# Patient Record
Sex: FEMALE | URBAN - METROPOLITAN AREA
[De-identification: names, ages, dates, MRNs, and addresses within clinical notes are randomized per-mention and may not be internally consistent; named-entity substitution may affect disease eponyms.]

---

## 2023-10-16 ENCOUNTER — ORDER TRANSCRIPTION (OUTPATIENT)
Dept: PHYSICAL THERAPY | Facility: HOSPITAL | Age: 28
End: 2023-10-16

## 2023-10-16 DIAGNOSIS — M54.2 NECK PAIN: Primary | ICD-10-CM

## 2023-10-18 ENCOUNTER — LAB ENCOUNTER (OUTPATIENT)
Dept: LAB | Age: 28
End: 2023-10-18
Attending: INTERNAL MEDICINE
Payer: COMMERCIAL

## 2023-10-18 DIAGNOSIS — Z00.00 ROUTINE CHECK-UP: Primary | ICD-10-CM

## 2023-10-18 LAB
ALBUMIN SERPL-MCNC: 3.9 G/DL (ref 3.4–5)
ALBUMIN/GLOB SERPL: 0.9 {RATIO} (ref 1–2)
ALP LIVER SERPL-CCNC: 58 U/L
ALT SERPL-CCNC: 22 U/L
ANION GAP SERPL CALC-SCNC: 5 MMOL/L (ref 0–18)
AST SERPL-CCNC: 14 U/L (ref 15–37)
BASOPHILS # BLD AUTO: 0.06 X10(3) UL (ref 0–0.2)
BASOPHILS NFR BLD AUTO: 0.9 %
BILIRUB SERPL-MCNC: 0.4 MG/DL (ref 0.1–2)
BILIRUB UR QL STRIP.AUTO: NEGATIVE
BUN BLD-MCNC: 12 MG/DL (ref 7–18)
CALCIUM BLD-MCNC: 9.1 MG/DL (ref 8.5–10.1)
CHLORIDE SERPL-SCNC: 106 MMOL/L (ref 98–112)
CHOLEST SERPL-MCNC: 208 MG/DL (ref ?–200)
CLARITY UR REFRACT.AUTO: CLEAR
CO2 SERPL-SCNC: 28 MMOL/L (ref 21–32)
CREAT BLD-MCNC: 0.94 MG/DL
EGFRCR SERPLBLD CKD-EPI 2021: 86 ML/MIN/1.73M2 (ref 60–?)
EOSINOPHIL # BLD AUTO: 0.21 X10(3) UL (ref 0–0.7)
EOSINOPHIL NFR BLD AUTO: 3.3 %
ERYTHROCYTE [DISTWIDTH] IN BLOOD BY AUTOMATED COUNT: 12 %
FASTING PATIENT LIPID ANSWER: YES
FASTING STATUS PATIENT QL REPORTED: YES
GLOBULIN PLAS-MCNC: 4.2 G/DL (ref 2.8–4.4)
GLUCOSE BLD-MCNC: 95 MG/DL (ref 70–99)
GLUCOSE UR STRIP.AUTO-MCNC: NORMAL MG/DL
HCT VFR BLD AUTO: 41.2 %
HDLC SERPL-MCNC: 58 MG/DL (ref 40–59)
HGB BLD-MCNC: 13.4 G/DL
IMM GRANULOCYTES # BLD AUTO: 0.02 X10(3) UL (ref 0–1)
IMM GRANULOCYTES NFR BLD: 0.3 %
KETONES UR STRIP.AUTO-MCNC: NEGATIVE MG/DL
LDLC SERPL CALC-MCNC: 108 MG/DL (ref ?–100)
LEUKOCYTE ESTERASE UR QL STRIP.AUTO: NEGATIVE
LYMPHOCYTES # BLD AUTO: 2.82 X10(3) UL (ref 1–4)
LYMPHOCYTES NFR BLD AUTO: 43.7 %
MCH RBC QN AUTO: 28.3 PG (ref 26–34)
MCHC RBC AUTO-ENTMCNC: 32.5 G/DL (ref 31–37)
MCV RBC AUTO: 87.1 FL
MONOCYTES # BLD AUTO: 0.47 X10(3) UL (ref 0.1–1)
MONOCYTES NFR BLD AUTO: 7.3 %
NEUTROPHILS # BLD AUTO: 2.88 X10 (3) UL (ref 1.5–7.7)
NEUTROPHILS # BLD AUTO: 2.88 X10(3) UL (ref 1.5–7.7)
NEUTROPHILS NFR BLD AUTO: 44.5 %
NITRITE UR QL STRIP.AUTO: NEGATIVE
NONHDLC SERPL-MCNC: 150 MG/DL (ref ?–130)
OSMOLALITY SERPL CALC.SUM OF ELEC: 288 MOSM/KG (ref 275–295)
PH UR STRIP.AUTO: 7.5 [PH] (ref 5–8)
PLATELET # BLD AUTO: 300 10(3)UL (ref 150–450)
POTASSIUM SERPL-SCNC: 4.3 MMOL/L (ref 3.5–5.1)
PROT SERPL-MCNC: 8.1 G/DL (ref 6.4–8.2)
PROT UR STRIP.AUTO-MCNC: NEGATIVE MG/DL
RBC # BLD AUTO: 4.73 X10(6)UL
RBC UR QL AUTO: NEGATIVE
SODIUM SERPL-SCNC: 139 MMOL/L (ref 136–145)
SP GR UR STRIP.AUTO: 1.01 (ref 1–1.03)
TRIGL SERPL-MCNC: 245 MG/DL (ref 30–149)
TSI SER-ACNC: 1.03 MIU/ML (ref 0.36–3.74)
UROBILINOGEN UR STRIP.AUTO-MCNC: NORMAL MG/DL
VLDLC SERPL CALC-MCNC: 42 MG/DL (ref 0–30)
WBC # BLD AUTO: 6.5 X10(3) UL (ref 4–11)

## 2023-10-18 PROCEDURE — 81003 URINALYSIS AUTO W/O SCOPE: CPT

## 2023-10-18 PROCEDURE — 36415 COLL VENOUS BLD VENIPUNCTURE: CPT

## 2023-10-18 PROCEDURE — 80053 COMPREHEN METABOLIC PANEL: CPT

## 2023-10-18 PROCEDURE — 85025 COMPLETE CBC W/AUTO DIFF WBC: CPT

## 2023-10-18 PROCEDURE — 80061 LIPID PANEL: CPT

## 2023-10-18 PROCEDURE — 84443 ASSAY THYROID STIM HORMONE: CPT

## 2023-10-31 ENCOUNTER — TELEPHONE (OUTPATIENT)
Dept: PHYSICAL THERAPY | Facility: HOSPITAL | Age: 28
End: 2023-10-31

## 2023-11-02 ENCOUNTER — OFFICE VISIT (OUTPATIENT)
Dept: PHYSICAL THERAPY | Age: 28
End: 2023-11-02
Attending: INTERNAL MEDICINE
Payer: COMMERCIAL

## 2023-11-02 DIAGNOSIS — M54.6 CHRONIC MIDLINE THORACIC BACK PAIN: ICD-10-CM

## 2023-11-02 DIAGNOSIS — G89.29 CHRONIC NECK PAIN: Primary | ICD-10-CM

## 2023-11-02 DIAGNOSIS — M54.2 CHRONIC NECK PAIN: Primary | ICD-10-CM

## 2023-11-02 DIAGNOSIS — M25.531 RIGHT WRIST PAIN: ICD-10-CM

## 2023-11-02 DIAGNOSIS — G89.29 CHRONIC MIDLINE THORACIC BACK PAIN: ICD-10-CM

## 2023-11-02 PROCEDURE — 97162 PT EVAL MOD COMPLEX 30 MIN: CPT

## 2023-11-02 PROCEDURE — 97110 THERAPEUTIC EXERCISES: CPT

## 2023-11-09 ENCOUNTER — OFFICE VISIT (OUTPATIENT)
Dept: PHYSICAL THERAPY | Age: 28
End: 2023-11-09
Attending: INTERNAL MEDICINE
Payer: COMMERCIAL

## 2023-11-09 DIAGNOSIS — M25.531 RIGHT WRIST PAIN: ICD-10-CM

## 2023-11-09 DIAGNOSIS — M54.6 CHRONIC MIDLINE THORACIC BACK PAIN: ICD-10-CM

## 2023-11-09 DIAGNOSIS — G89.29 CHRONIC MIDLINE THORACIC BACK PAIN: ICD-10-CM

## 2023-11-09 DIAGNOSIS — G89.29 CHRONIC NECK PAIN: Primary | ICD-10-CM

## 2023-11-09 DIAGNOSIS — M54.2 CHRONIC NECK PAIN: Primary | ICD-10-CM

## 2023-11-09 PROCEDURE — 97110 THERAPEUTIC EXERCISES: CPT

## 2023-11-09 PROCEDURE — 97140 MANUAL THERAPY 1/> REGIONS: CPT

## 2023-11-09 NOTE — PROGRESS NOTES
PT DAILY NOTE  Diagnosis:   Chronic Neck pain (M54.2, G89.29), chronic midline thoracic spine pain (M54.6, G89.29), R wrist pain (M25.531) Referring Provider: Garrett Cox MD Date of Evaluation:    11/2/2023    Precautions:  None Next MD visit:   none scheduled  Date of Surgery: n/a     Insurance Primary/Secondary: BCBS II HMO/IHP     Visit 2 of 5   Date POC Expires: 12-31-23       Subjective: Pt states she has been feeling pretty good since last visit. There was one night where her pain was up to 4-5/10 while looking at the computer at home. The HEP is going well. She has still been getting headaches several times over the past week. Pain: 2/10   @eval: Gabby Valle is a 34year old female who presents to therapy today with complaints of chronic central neck pain last 10 yrs, down to between shoulder blades. Pt states she finally has insurance that will allow her to get therapy for this issue. 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 She has seen 2 chiropractors only for a total of 4 visits over the years, doesn't like the idea of bones cracking. She recalls having full spine XR in 2019 without any significant findings. Occasionally she will get pain radiating to shoulders x1-2/wk. 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 Headaches 1-2x/wk usually after being at desk a long time. Some N/T R wrist that radiates to forearm if she is writing or typing a lot she will feel it. Pt is R hand dominant. Pt feels those symtpoms are not related to/different pattern from her neck pain. ~1x/mo she will 'pull a neck muscle' in middle of night. Denies any hand weakness. Works out at gym at least 4x/wk. UA to do a crunch which places too much pain on neck, UA to do plank 2o excessive wrist pain.  Pt notes she incorrectly indicated concern with personal safety screening questions, and denies any conflict at home or thoughts of self-harm. Pt describes pain level  at best 1/10, at worst 5/10. Current functional limitations include neck pain c prolonged sitting/studying/looking down, headaches x1-2/wk, disturbed sleep 2o neck pain, neck pain c attempted abdominal crunch. Shayla Comer describes prior level of function has had some degree of pain c above activities for many years, but not as intense. Pt goals include reduce or resolve neck/upper back pain, resolve headaches, undisturbed sleep. Past medical history was reviewed with Shaylamaricarmen Comer. Significant findings include asthma. Pt denies diplopia, dysarthria, dysphasia, dizziness, drop attacks, bowel/bladder changes, saddle anesthesia, and DAKOTAH LE N/T. Objective:   Marked tightness throughout B suboccipitals, addressed c manual tx. Main pain about CT junction and interscapular    Treatment:  (\"NP\" indicates Not Performed this date)  Manual Therapy: Added STM R UT/levator in sitting c massage cream  Added STM B T1-3 paraspinals  Added central PA T1-2 gr 2  Added transverse mob R->L at T1  Added SOR  Added manual cervical distraction 4min  Added R 1st rib mobs    Neuromuscular Re-education:    Therapeutic Exercise: Added fwd/retro UBE x2min ea  Standing bentover Ues on table cat stretch 5\"x10  Supine cervical nods 5\"x20  Prone scap squeezes 5\"x10  Prone mid trap 5\"x10  Doorway pec stretch 15\"x3  Added Serratus push to hold FR against wall +shld flex/ext up wall 2x10  Added B row GTB 5\"x20  Added B shld ER RTB 5\"x15    Future visits: R median nerve glides, functional training to maintain neutral C-spine c forward-bending activities, cont RTC/scapular strengthening     HEP:  Access Code: [email or web address] ; URL: [email or web address]  Date: 11/02/2023 ; Prepared by: Delta Valentina  - Supine Head Nod Deep Neck Flexor Training  - 2 x daily - 20 reps - 5 hold  - Prone Shoulder Horizontal Abduction on Swiss Ball  - 2 x daily - 10 reps - 5 hold  - Prone Scapular Retraction  - 2 x daily - 10 reps - 5 hold  - Corner Pec Major Stretch  - 2 x daily - 2 sets - 3 reps - 15 hold  - Cat Cow  - 2 x daily - 10 reps - 5 hold  11-9: B row c band, B shld ER RTB 5\"x15, shraddha updated    Assessment/Plan: Marked tightness throughout B suboccipitals likely contributing to headache symptoms, addressed c manual tx. Other  manual tx added today to improve mobility at CT junction and promote improved C-T posture. Pt also tolerated several new ex's to promote scapular and rotator cuff strength for symptom reduction. Cont to monitor c/o and address deficits to work toward [employer] and set goals. PLAN OF CARE:    Goals: (to be met in 8 visits)   Consistently decr pain < or = 2/10 intermittent for incr QOL and activity tolerance  Overall incr in function as indicated by decr NDI at least 10 pts  Resolution of headaches to indicate decr irritability of symptoms  Painfree AROM c-spine and shoulder to indicate decr irritability of symptoms and allow for decr pain c daily activities  Incr scapular strength at least 4-/5 to promote incr ability to sustain and tolerate prolonged postures  Lack of sleep disturbance due to neck muscle spasm for incr QOL  Indep HEP to promote cont progress toward functional goals    Frequency / Duration: Patient will be seen for 1-2 x/week or a total of 8 visits over a 90 day period. All Treatments performed at distinct and separate times during the therapy session.   Treatment Minutes  Units charged   Manual Therapy 25 minutes 2   Therapeutic Activity 0 minutes    Neuromuscular Re-education 0 minutes    Therapeutic Exercise 20 minutes 1   Total Direct Treatment Time 45 minutes

## 2023-11-10 ENCOUNTER — IMMUNIZATION (OUTPATIENT)
Dept: LAB | Age: 28
End: 2023-11-10
Attending: EMERGENCY MEDICINE
Payer: COMMERCIAL

## 2023-11-10 DIAGNOSIS — Z23 NEED FOR VACCINATION: Primary | ICD-10-CM

## 2023-11-10 PROCEDURE — 90480 ADMN SARSCOV2 VAC 1/ONLY CMP: CPT

## 2023-11-16 ENCOUNTER — OFFICE VISIT (OUTPATIENT)
Dept: PHYSICAL THERAPY | Age: 28
End: 2023-11-16
Attending: INTERNAL MEDICINE
Payer: COMMERCIAL

## 2023-11-16 PROCEDURE — 97140 MANUAL THERAPY 1/> REGIONS: CPT

## 2023-11-16 PROCEDURE — 97110 THERAPEUTIC EXERCISES: CPT

## 2023-11-16 NOTE — PROGRESS NOTES
PT DAILY NOTE  Diagnosis:   Chronic Neck pain (M54.2, G89.29), chronic midline thoracic spine pain (M54.6, G89.29), R wrist pain (M25.531) Referring Provider: Deepa Paez MD Date of Evaluation:    11/2/2023    Precautions:  None Next MD visit:   none scheduled  Date of Surgery: n/a     Insurance Primary/Secondary: BCBS II HMO/IHP     Visit 3 of 5 11/16/2023  Date POC Expires: 12-31-23       Subjective: Pt states this week has been a pretty stressful week due to exams for nursing and says she has still been getting headaches and now lower on her back than usual, but pain in her back. Patient states she does get relief with HEP when symptoms worsened due to stress/posture  Pain: 3/10 beginning tx, 0/10 ending   @eval: Mercedes Sepulveda is a 34year old female who presents to therapy today with complaints of chronic central neck pain last 10 yrs, down to between shoulder blades. Pt states she finally has insurance that will allow her to get therapy for this issue. 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 She has seen 2 chiropractors only for a total of 4 visits over the years, doesn't like the idea of bones cracking. She recalls having full spine XR in 2019 without any significant findings. Occasionally she will get pain radiating to shoulders x1-2/wk. 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 Headaches 1-2x/wk usually after being at desk a long time. Some N/T R wrist that radiates to forearm if she is writing or typing a lot she will feel it. Pt is R hand dominant. Pt feels those symtpoms are not related to/different pattern from her neck pain. ~1x/mo she will 'pull a neck muscle' in middle of night. Denies any hand weakness. Works out at gym at least 4x/wk. UA to do a crunch which places too much pain on neck, UA to do plank 2o excessive wrist pain. Pt notes she incorrectly indicated concern with personal safety screening questions, and denies any conflict at home or thoughts of self-harm. Pt describes pain level  at best 1/10, at worst 5/10. Current functional limitations include neck pain c prolonged sitting/studying/looking down, headaches x1-2/wk, disturbed sleep 2o neck pain, neck pain c attempted abdominal crunch. Inna Madrigal describes prior level of function has had some degree of pain c above activities for many years, but not as intense. Pt goals include reduce or resolve neck/upper back pain, resolve headaches, undisturbed sleep. Past medical history was reviewed with Inna Madrigal. Significant findings include asthma. Pt denies diplopia, dysarthria, dysphasia, dizziness, drop attacks, bowel/bladder changes, saddle anesthesia, and DAKOTAH LE N/T. Objective:   Marked tightness throughout B suboccipitals, addressed c manual tx. Main pain about CT junction and interscapular    Treatment:  (\"NP\" indicates Not Performed this date)  Manual Therapy:20'  Added STM R UT/levator in sitting c massage cream  Added STM B T1-3 paraspinals  Added Cupping: Thoracolumbar Paraspinals T4-L2  SOR  manual cervical distraction 4min    NP  central PA T1-2 gr 2  transverse mob R->L at T1  R 1st rib mobs    Neuromuscular Re-education:    Therapeutic Exercise:  fwd/retro UBE x2min ea  Prone scap squeezes 5\"x15  Prone mid trap 5\"x15  Added B Prone LT x10 3\" H   Supine cervical nods 5\"x20  Standing bentover Ues on table cat stretch 5\"x10  Doorway pec stretch 15\"x5  Serratus push to hold FR against wall +shld flex/ext up wall 2x10  B row GTB x10 5\" H   Added B Shoulder Extension GTB x10 5\" H    NP  Added B shld ER RTB 5\"x15    Future visits: R median nerve glides, functional training to maintain neutral C-spine c forward-bending activities, cont RTC/scapular strengthening     HEP:  Access Code: [email or web address] ; URL: [email or web address]  Date: 11/02/2023 ; Prepared by: Pierre Wetzel Supine Head Nod Deep Neck Flexor Training  - 2 x daily - 20 reps - 5 hold  - Prone Shoulder Horizontal Abduction on Swiss Ball  - 2 x daily - 10 reps - 5 hold  - Prone Scapular Retraction  - 2 x daily - 10 reps - 5 hold  - Corner Pec Major Stretch  - 2 x daily - 2 sets - 3 reps - 15 hold  - Cat Cow  - 2 x daily - 10 reps - 5 hold  11-9: B row c band, B shld ER RTB 5\"x15, shraddha updated    Assessment/Plan: Continued postural strengthening and manual of cervical, thoracic, and some lumbar spine. Added prone LT lifts with minimal UE compensation noted, but with subocc still presenting with tension suggesting cause of continued headaches. Added cupping to upper lumbar and mid to lower thoracic paraspinals with improved symptoms reported ending session. PLAN OF CARE:    Goals: (to be met in 8 visits)   Consistently decr pain < or = 2/10 intermittent for incr QOL and activity tolerance  Overall incr in function as indicated by decr NDI at least 10 pts  Resolution of headaches to indicate decr irritability of symptoms  Painfree AROM c-spine and shoulder to indicate decr irritability of symptoms and allow for decr pain c daily activities  Incr scapular strength at least 4-/5 to promote incr ability to sustain and tolerate prolonged postures  Lack of sleep disturbance due to neck muscle spasm for incr QOL  Indep HEP to promote cont progress toward functional goals    Frequency / Duration: Patient will be seen for 1-2 x/week or a total of 8 visits over a 90 day period. All Treatments performed at distinct and separate times during the therapy session.   Treatment Minutes  Units charged   Manual Therapy 20 minutes 1   Therapeutic Activity 0 minutes    Neuromuscular Re-education 0 minutes    Therapeutic Exercise 25 minutes 2   Total Direct Treatment Time 45 minutes

## 2023-11-22 ENCOUNTER — OFFICE VISIT (OUTPATIENT)
Dept: PHYSICAL THERAPY | Age: 28
End: 2023-11-22
Attending: INTERNAL MEDICINE
Payer: COMMERCIAL

## 2023-11-22 PROCEDURE — 97140 MANUAL THERAPY 1/> REGIONS: CPT

## 2023-11-22 PROCEDURE — 97110 THERAPEUTIC EXERCISES: CPT

## 2023-11-22 NOTE — PROGRESS NOTES
PT DAILY NOTE  Diagnosis:   Chronic Neck pain (M54.2, G89.29), chronic midline thoracic spine pain (M54.6, G89.29), R wrist pain (M25.531) Referring Provider: Hillary Ramos MD Date of Evaluation:    11/2/2023    Precautions:  None Next MD visit:   none scheduled  Date of Surgery: n/a     Insurance Primary/Secondary: BCBS II HMO/IHP     Visit 4 of 5 11/22/2023  Date POC Expires: 12-31-23     Subjective: Pt states pain has been minimal since previous session but says she does continue to have some tension in her neck and upper shoulders, primarily on right  Pain: 1/10 beginning tx,   @eval: Francesca Quigley is a 34year old female who presents to therapy today with complaints of chronic central neck pain last 10 yrs, down to between shoulder blades. Pt states she finally has insurance that will allow her to get therapy for this issue. 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 She has seen 2 chiropractors only for a total of 4 visits over the years, doesn't like the idea of bones cracking. She recalls having full spine XR in 2019 without any significant findings. Occasionally she will get pain radiating to shoulders x1-2/wk. 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 Headaches 1-2x/wk usually after being at desk a long time. Some N/T R wrist that radiates to forearm if she is writing or typing a lot she will feel it. Pt is R hand dominant. Pt feels those symtpoms are not related to/different pattern from her neck pain. ~1x/mo she will 'pull a neck muscle' in middle of night. Denies any hand weakness. Works out at gym at least 4x/wk. UA to do a crunch which places too much pain on neck, UA to do plank 2o excessive wrist pain.  Pt notes she incorrectly indicated concern with personal safety screening questions, and denies any conflict at home or thoughts of self-harm. Pt describes pain level  at best 1/10, at worst 5/10. Current functional limitations include neck pain c prolonged sitting/studying/looking down, headaches x1-2/wk, disturbed sleep 2o neck pain, neck pain c attempted abdominal crunch. Nel Romero describes prior level of function has had some degree of pain c above activities for many years, but not as intense. Pt goals include reduce or resolve neck/upper back pain, resolve headaches, undisturbed sleep. Past medical history was reviewed with Nel Romero. Significant findings include asthma. Pt denies diplopia, dysarthria, dysphasia, dizziness, drop attacks, bowel/bladder changes, saddle anesthesia, and DAKOTAH LE N/T. Objective:   Mild scapular winging noted on L     Treatment:  (\"NP\" indicates Not Performed this date)  Manual Therapy:15'  Added STM R UT/levator in sitting c massage cream  Added STM B T1-3 paraspinals  Added GT and cupping B UT/Levators       NP  central PA T1-2 gr 2  transverse mob R->L at T1  R 1st rib mobs  SOR  manual cervical distraction 4min      Neuromuscular Re-education:    Therapeutic Exercise:38'  fwd/retro UBE x2min ea  Added Standing GTB Rows x10 5\" H  Added Standing GTB Shoulder Ext x10 5\" H   Prone scap squeezes 5\"x20 (Incr)  Prone mid trap 5\"x20  B Prone LT x10 3\" H   Supine cervical nods 5\"x20  Added 1st Rib Mob with Strap x10 10\" ea R/L   Doorway pec stretch 15\"x5  Serratus push to hold FR against wall +shld flex/ext up wall 2x10    NP  Added B shld ER RTB 5\"x15  Standing bentover Ues on table cat stretch 5\"x10    Future visits: R median nerve glides, functional training to maintain neutral C-spine c forward-bending activities, cont RTC/scapular strengthening     HEP:  Access Code: [email or web address] ; URL: [email or web address]  Date: 11/02/2023 ; Prepared by: Gail Hardy  - Supine Head Nod Deep Neck Flexor Training  - 2 x daily - 20 reps - 5 hold  - Prone Shoulder Horizontal Abduction on Swiss Ball  - 2 x daily - 10 reps - 5 hold  - Prone Scapular Retraction  - 2 x daily - 10 reps - 5 hold  - Corner Pec Major Stretch  - 2 x daily - 2 sets - 3 reps - 15 hold  - Cat Cow  - 2 x daily - 10 reps - 5 hold  11-9: B row c band, B shld ER RTB 5\"x15, shraddha updated    Assessment/Plan: Improved tissue mobility carry over of thoracolumbar paraspinals noted from previous session, but with upper thoracic and cervical tension still noted. Addressed with GT and cupping bilateral UT and continued postural strengthening. Plan to reassess next visit     PLAN OF CARE:    Goals: (to be met in 8 visits)   Consistently decr pain < or = 2/10 intermittent for incr QOL and activity tolerance  Overall incr in function as indicated by decr NDI at least 10 pts  Resolution of headaches to indicate decr irritability of symptoms  Painfree AROM c-spine and shoulder to indicate decr irritability of symptoms and allow for decr pain c daily activities  Incr scapular strength at least 4-/5 to promote incr ability to sustain and tolerate prolonged postures  Lack of sleep disturbance due to neck muscle spasm for incr QOL  Indep HEP to promote cont progress toward functional goals    Frequency / Duration: Patient will be seen for 1-2 x/week or a total of 8 visits over a 90 day period. All Treatments performed at distinct and separate times during the therapy session.   Treatment Minutes  Units charged   Manual Therapy 15 minutes 1   Therapeutic Activity 0 minutes    Neuromuscular Re-education 0 minutes    Therapeutic Exercise 38 minutes 3   Total Direct Treatment Time 54 minutes

## 2023-11-30 ENCOUNTER — APPOINTMENT (OUTPATIENT)
Dept: PHYSICAL THERAPY | Age: 28
End: 2023-11-30
Attending: INTERNAL MEDICINE
Payer: COMMERCIAL

## 2023-12-07 ENCOUNTER — OFFICE VISIT (OUTPATIENT)
Dept: PHYSICAL THERAPY | Age: 28
End: 2023-12-07
Attending: INTERNAL MEDICINE
Payer: COMMERCIAL

## 2023-12-07 PROCEDURE — 97140 MANUAL THERAPY 1/> REGIONS: CPT

## 2023-12-07 PROCEDURE — 97110 THERAPEUTIC EXERCISES: CPT

## 2023-12-07 NOTE — PROGRESS NOTES
Progress Summary  Pt has attended 5 visits in Physical Therapy. Diagnosis:   Chronic Neck pain (M54.2, G89.29), chronic midline thoracic spine pain (M54.6, G89.29), R wrist pain (M25.531) Referring Provider: Vincent Ramsey MD Date of Evaluation:    11/2/2023    Precautions:  None Next MD visit:   none scheduled  Date of Surgery: n/a   Insurance Primary/Secondary: BCBS II HMO/IHP     Visit 5 of 8 12/07/2023  Date POC Expires: 12-31-23     Subjective: Patient states she has been feeling compression in lower neck that provides comfort when in cervical extension but overall has been having less severe pain, but still frequent. Pain:   Worst: 4/10  Current: 2/10  Best: 0/10  Assessment/Plan: Patient demonstrates some amount of regression in symptom irritability and scapular strength due to gap in care and higher stress, but has improved AROM BUE in all planes and static strength with minimal to no pain. Patient continues to present with palpable tension in R UT, supraspinatus, and rhomboids with significant strength deficit of RUE still present as well. Patient would benefit from continued 3 visits within original POC for total of 8 visits at once a week frequency. @eval: Laisha Niño is a 34year old female who presents to therapy today with complaints of chronic central neck pain last 10 yrs, down to between shoulder blades. Pt states she finally has insurance that will allow her to get therapy for this issue. 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 She has seen 2 chiropractors only for a total of 4 visits over the years, doesn't like the idea of bones cracking. She recalls having full spine XR in 2019 without any significant findings. Occasionally she will get pain radiating to shoulders x1-2/wk. 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 Headaches 1-2x/wk usually after being at desk a long time. Some N/T R wrist that radiates to forearm if she is writing or typing a lot she will feel it. Pt is R hand dominant. Pt feels those symtpoms are not related to/different pattern from her neck pain. ~1x/mo she will 'pull a neck muscle' in middle of night. Denies any hand weakness. Works out at gym at least 4x/wk. UA to do a crunch which places too much pain on neck, UA to do plank 2o excessive wrist pain. Pt notes she incorrectly indicated concern with personal safety screening questions, and denies any conflict at home or thoughts of self-harm. Pt describes pain level  at best 1/10, at worst 5/10. Current functional limitations include neck pain c prolonged sitting/studying/looking down, headaches x1-2/wk, disturbed sleep 2o neck pain, neck pain c attempted abdominal crunch. [place] describes prior level of function has had some degree of pain c above activities for many years, but not as intense. Pt goals include reduce or resolve neck/upper back pain, resolve headaches, undisturbed sleep. Past medical history was reviewed with [place]. Significant findings include asthma. Pt denies diplopia, dysarthria, dysphasia, dizziness, drop attacks, bowel/bladder changes, saddle anesthesia, and DAKOTAH LE N/T. Objective:   Mild scapular winging noted on L     Observation/Posture: loss of thoracic kypohosis, mild forward head posture     Cervical AROM (IE 11/02): (* denotes performed with pain)  Flexion: 50*  Extension: 76*  Sidebending: R 40*R medial scap; L 42  Rotation: R 73*R medial scap; L 59    Cervical AROM (12/07):    Flexion: 55* ; 50 pain free   Extension: 76* ; 58 pain free  Sidebending: R: 50 L: 46 (WNL B)  Rotation: R: 72 L: 55    Shoulder AROM (IE 11/02): (* denotes performed with pain)  Flexion: R 170* R medial scap / L 185  Abd: full and painfree B  ER HBH: full c pain on R only  IR HBB: R 21cm* thumb to C7/ L 16cm    Shoulder AROM (12/07/2023): Donald Keenan denotes performed with pain)  Flexion: 175 B  Abd: WFL B  ER HBH: WNL B  IR HBB: R: T10* L: T6 No pain     Strength: (* denotes performed with pain)  @eval: 12/07/2023   Shoulder Flex: R 4*scap/5, L 4+/5  Shoulder ABD (C5): R 4*neck/5, L 4+/5  Shld ER: R 3+/ L 3+  Shld IR: R 4/ L 4  Biceps (C6): R 5*neck/5, L 5/5  Wrist ext (C6): R 5*wrist/5, L 5/5  Triceps (C7): R 5/5, L 5/5  Wrist Flex (C7): R 5*wrist/5, L 5/5     Mid trap: R 3+*scap/5; L 3+/5  Low trap: R 3*scap/5; L 3*neck/5 Shoulder Flex: 5/5 B  Shoulder ABD (C5): R: 4+/5 No pain L: 5/5  Shld ER: R: 4+*/5 Supra Pain L: 4+/5  Shld IR: 5/5 B  Biceps (C6): 5/5 B  Wrist ext (C6): 5/5 B R*  Triceps (C7): 5/5 B   Wrist Flex (C7): 5/5 B R*     Mid trap: R: 3+/5* L: 4-/5  Low trap: R: 3/5* L: 3+/5       Treatment:  (\"NP\" indicates Not Performed this date)  Manual Therapy:15'  MFR/TrPR: R Rhomboids/Supraspinatus    Added STM R UT/levator in sitting c massage cream  Added STM B T1-3 paraspinals  Added GT and cupping B UT/Levators   central PA T1-2 gr 2  transverse mob R->L at T1  R 1st rib mobs  SOR  manual cervical distraction 4min      Neuromuscular Re-education:    Therapeutic Exercise:30'  Reassess  Prone Scapular Retraction x20 5\" H  Prone Uni Mid Trap Raise x10 5\" H ea R/L  Prone LT Raise x10 5\" H ea R/L    Added H/L Shoulder Ext R, Stab L at 90 Deg x15 3\" H   Added H/L Shoulder Ext L, Stab R at 90 Deg x15 3\" H   Updated HEP    Future Visits: S/L R ER, Levator Stretch, UT stretch, resume 1st rib mob with strap    NP   fwd/retro UBE x2min ea  Added Standing GTB Rows x10 5\" H  Added Standing GTB Shoulder Ext x10 5\" H   Supine cervical nods 5\"x20  Added 1st Rib Mob with Strap x10 10\" ea R/L   Doorway pec stretch 15\"x5  Serratus push to hold FR against wall +shld flex/ext up wall 2x10  Added B shld ER RTB 5\"x15    Future visits: R median nerve glides, functional training to maintain neutral C-spine c forward-bending activities, cont RTC/scapular strengthening     HEP: Access Code: [email or web address] ; URL: [email or web address]/  Date: 11/02/2023 ; Prepared by: Vadim Drought  - Supine Head Nod Deep Neck Flexor Training  - 2 x daily - 20 reps - 5 hold  - Prone Shoulder Horizontal Abduction on Swiss Ball  - 2 x daily - 10 reps - 5 hold  - Prone Scapular Retraction  - 2 x daily - 10 reps - 5 hold  - Corner Pec Major Stretch  - 2 x daily - 2 sets - 3 reps - 15 hold  - Cat Cow  - 2 x daily - 10 reps - 5 hold  11-9: B row c band, B shld ER RTB 5\"x15, shraddha updated    Updated HEP Access Code: [email or web address]  URL: [email or web address]/  Date: 12/07/2023  Prepared by: Mora at 60 Elevation  - 2-3 x daily - 7 x weekly - 5 sets - 20s hold  - Supine Shoulder Horizontal Abduction with Resistance  - 1 x daily - 7 x weekly - 3 sets - 10 reps  - Supine Alternating Shoulder Flexion  - 1 x daily - 7 x weekly - 3 sets - 10 reps  - Prone Shoulder Horizontal Abduction  - 1 x daily - 7 x weekly - 2 sets - 10 reps - 5s hold  - Prone Shoulder Horizontal Abduction (Mirrored)  - 1 x daily - 7 x weekly - 2 sets - 10 reps - 5s hold  - Prone Single Arm Shoulder Y  - 1 x daily - 7 x weekly - 2 sets - 10 reps - 5s hold  - Prone Single Arm Shoulder Y (Mirrored)  - 1 x daily - 7 x weekly - 2 sets - 10 reps - 5s hold  - Prone Scapular Retraction  - 1 x daily - 7 x weekly - 1 sets - 20 reps - 5s hold  - Supine Chin Tuck  - 1 x daily - 7 x weekly - 1 sets - 20 reps - 5 hold    PLAN OF CARE:    Goals: (to be met in 8 visits)   Consistently decr pain < or = 2/10 intermittent for incr QOL and activity tolerance Progressing 12/07  Overall incr in function as indicated by decr NDI at least 10 pts Regression d/t gap in care 12/07  Resolution of headaches to indicate decr irritability of symptoms Progressing, less severe 12/07  Painfree AROM c-spine and shoulder to indicate decr irritability of symptoms and allow for decr pain c daily activities Met in AROM Shoulder, but painful in all Cervical AROM still present 12/07  Incr scapular strength at least 4-/5 to promote incr ability to sustain and tolerate prolonged postures Progressing 12/07, delay in RUE  Lack of sleep disturbance due to neck muscle spasm for incr QOL Progressing 12/07  Indep HEP to promote cont progress toward functional goals Met 12/07; Updated New     Frequency / Duration: Patient will be seen for 1-2 x/week or a total of 8 visits over a 90 day period. All Treatments performed at distinct and separate times during the therapy session.   Treatment Minutes  Units charged   Manual Therapy 15 minutes 1   Therapeutic Activity 0 minutes    Neuromuscular Re-education 0 minutes    Therapeutic Exercise 30 minutes 2   Total Direct Treatment Time 45 minutes      Post Neck Disability Index Score  Post Score: 24 % (12/7/2023  3:13 PM)    -4 % improvement

## 2023-12-14 ENCOUNTER — OFFICE VISIT (OUTPATIENT)
Dept: PHYSICAL THERAPY | Age: 28
End: 2023-12-14
Attending: INTERNAL MEDICINE
Payer: COMMERCIAL

## 2023-12-14 PROCEDURE — 97110 THERAPEUTIC EXERCISES: CPT

## 2023-12-14 PROCEDURE — 97140 MANUAL THERAPY 1/> REGIONS: CPT

## 2023-12-14 NOTE — PROGRESS NOTES
Diagnosis:   Chronic Neck pain (M54.2, G89.29), chronic midline thoracic spine pain (M54.6, G89.29), R wrist pain (M25.531) Referring Provider: Liz Viera MD Date of Evaluation:    11/2/2023    Precautions:  None Next MD visit:   none scheduled  Date of Surgery: n/a   Insurance Primary/Secondary: BCBS II HMO/IHP     Visit 6 of 8 12/14/2023  Date POC Expires: 12-31-23     Subjective: Patient states she is done with finals and got good rest last night and has been feeling good since this morning. Patient states pain has been mild since acclimating to updated HEP last week  Pain: 0/10  Assessment/Plan: Patient demonstrates significant improvements in mechanics for prone LT and MT raises this day compared to previous visits and strength gain from 3+ to ~4/5. Patient able to perform all postural strengthening without scapular muscle spasms like usual, but with tingling in right fingers still present after long durations of typing per subjective reports. Continue cervical distraction either manual or with machine to address this. Plan to reassess at visit 8 and consider DC at that time    @eval: Desmond Espinosa is a 34year old female who presents to therapy today with complaints of chronic central neck pain last 10 yrs, down to between shoulder blades. Pt states she finally has insurance that will allow her to get therapy for this issue. 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 She has seen 2 chiropractors only for a total of 4 visits over the years, doesn't like the idea of bones cracking. She recalls having full spine XR in 2019 without any significant findings. Occasionally she will get pain radiating to shoulders x1-2/wk. 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 Headaches 1-2x/wk usually after being at desk a long time.  Some N/T R wrist that radiates to forearm if she is writing or typing a lot she will feel it. Pt is R hand dominant. Pt feels those symtpoms are not related to/different pattern from her neck pain. ~1x/mo she will 'pull a neck muscle' in middle of night. Denies any hand weakness. Works out at gym at least 4x/wk. UA to do a crunch which places too much pain on neck, UA to do plank 2o excessive wrist pain. Pt notes she incorrectly indicated concern with personal safety screening questions, and denies any conflict at home or thoughts of self-harm. Pt describes pain level  at best 1/10, at worst 5/10. Current functional limitations include neck pain c prolonged sitting/studying/looking down, headaches x1-2/wk, disturbed sleep 2o neck pain, neck pain c attempted abdominal crunch. [place] describes prior level of function has had some degree of pain c above activities for many years, but not as intense. Pt goals include reduce or resolve neck/upper back pain, resolve headaches, undisturbed sleep. Past medical history was reviewed with Maeve Scott. Significant findings include asthma. Pt denies diplopia, dysarthria, dysphasia, dizziness, drop attacks, bowel/bladder changes, saddle anesthesia, and DAKOTAH LE N/T.     Objective: R Mid Trap MMT: 4/4- /5    Treatment:  (\"NP\" indicates Not Performed this date)  Manual Therapy:10'  MFR/TrPR: R Rhomboids/Supraspinatus  SOR  Manual UT stretch in H/L  1st Rib Mob (Tingling Subsides)    Neuromuscular Re-education:    Therapeutic Exercise:38'  fwd/retro UBE x2min ea  Added H/L Thor Mobilization with Ball x20 (RUE Ext)  Prone Scapular Retraction x20 5\" H  Prone Uni Mid Trap Raise x10 5\" H ea R/L  Prone LT Raise x10 5\" H ea R/L    H/L Shoulder Ext R, Stab L at 90 Deg x15 3\" H GTB  H/L Shoulder Ext L, Stab R at 90 Deg x15 3\" H GTB  H/L Shoulder HA R, Stab L at 90 Deg x15 3\" H GTB  H/L Shoulder HA L, Stab R at 90 Deg x15 3\" H GTB  1st Rib Mob with Strap x10 10\" R Only  Added Self Mobilization with Towel in Cer Ext x20 10\"   Added Standing LT Setting x10 3\" H    Future visits: R median nerve glides, functional training to maintain neutral C-spine c forward-bending activities, cont RTC/scapular strengthening     HEP:  Access Code: [email or web address] ; URL: [email or web address]  Date: 11/02/2023 ; Prepared by: Son Gearing  - Supine Head Nod Deep Neck Flexor Training  - 2 x daily - 20 reps - 5 hold  - Prone Shoulder Horizontal Abduction on Swiss Ball  - 2 x daily - 10 reps - 5 hold  - Prone Scapular Retraction  - 2 x daily - 10 reps - 5 hold  - Corner Pec Major Stretch  - 2 x daily - 2 sets - 3 reps - 15 hold  - Cat Cow  - 2 x daily - 10 reps - 5 hold  11-9: B row c band, B shld ER RTB 5\"x15, shraddha updated    Updated HEP Access Code: [email or web address]  URL: [employer]/  Date: 12/07/2023  Prepared by: Mora at 60 Elevation  - 2-3 x daily - 7 x weekly - 5 sets - 20s hold  - Supine Shoulder Horizontal Abduction with Resistance  - 1 x daily - 7 x weekly - 3 sets - 10 reps  - Supine Alternating Shoulder Flexion  - 1 x daily - 7 x weekly - 3 sets - 10 reps  - Prone Shoulder Horizontal Abduction  - 1 x daily - 7 x weekly - 2 sets - 10 reps - 5s hold  - Prone Shoulder Horizontal Abduction (Mirrored)  - 1 x daily - 7 x weekly - 2 sets - 10 reps - 5s hold  - Prone Single Arm Shoulder Y  - 1 x daily - 7 x weekly - 2 sets - 10 reps - 5s hold  - Prone Single Arm Shoulder Y (Mirrored)  - 1 x daily - 7 x weekly - 2 sets - 10 reps - 5s hold  - Prone Scapular Retraction  - 1 x daily - 7 x weekly - 1 sets - 20 reps - 5s hold  - Supine Chin Tuck  - 1 x daily - 7 x weekly - 1 sets - 20 reps - 5 hold    PLAN OF CARE:    Goals: (to be met in 8 visits)   Consistently decr pain < or = 2/10 intermittent for incr QOL and activity tolerance Progressing 12/07  Overall incr in function as indicated by decr NDI at least 10 pts Regression d/t gap in care 12/07  Resolution of headaches to indicate decr irritability of symptoms Progressing, less severe 12/07  Painfree AROM c-spine and shoulder to indicate decr irritability of symptoms and allow for decr pain c daily activities Met in AROM Shoulder, but painful in all Cervical AROM still present 12/07  Incr scapular strength at least 4-/5 to promote incr ability to sustain and tolerate prolonged postures Progressing 12/07, delay in RUE  Lack of sleep disturbance due to neck muscle spasm for incr QOL Progressing 12/07  Indep HEP to promote cont progress toward functional goals Met 12/07; Updated New     Frequency / Duration: Patient will be seen for 1-2 x/week or a total of 8 visits over a 90 day period. All Treatments performed at distinct and separate times during the therapy session.   Treatment Minutes  Units charged   Manual Therapy 10 minutes 1   Therapeutic Activity 0 minutes    Neuromuscular Re-education 0 minutes    Therapeutic Exercise 38 minutes 3   Total Direct Treatment Time 45 minutes

## 2023-12-21 ENCOUNTER — APPOINTMENT (OUTPATIENT)
Dept: PHYSICAL THERAPY | Age: 28
End: 2023-12-21
Attending: INTERNAL MEDICINE
Payer: COMMERCIAL

## 2023-12-21 ENCOUNTER — TELEPHONE (OUTPATIENT)
Dept: PHYSICAL THERAPY | Age: 28
End: 2023-12-21

## 2023-12-21 ENCOUNTER — OFFICE VISIT (OUTPATIENT)
Dept: PHYSICAL THERAPY | Age: 28
End: 2023-12-21
Attending: INTERNAL MEDICINE
Payer: COMMERCIAL

## 2023-12-21 PROCEDURE — 97110 THERAPEUTIC EXERCISES: CPT

## 2023-12-21 PROCEDURE — 97140 MANUAL THERAPY 1/> REGIONS: CPT

## 2023-12-21 NOTE — PROGRESS NOTES
Discharge Summary  Pt has attended 7 visits in Physical Therapy. Diagnosis:   Chronic Neck pain (M54.2, G89.29), chronic midline thoracic spine pain (M54.6, G89.29), R wrist pain (M25.531) Referring Provider: Gricelda Ibanez MD Date of Evaluation:    11/2/2023    Precautions:  None Next MD visit:   none scheduled  Date of Surgery: n/a   Insurance Primary/Secondary: BCBS II HMO/IHP     Visit 7 of 8 12/21/2023  Date POC Expires: 12-31-23     Subjective: Patient states she has been able to resume her regular strength training routine with little to no neck pain, and has been able to drive with more ease when having to turn her head side to side. Patient states there is still some limitation with this, but no longer has pain or severe restriction. Patient states she has been getting intermittent tingling in her right hand in the last couple of weeks, but says its typically mild and doesn't last long. Patient states she feels stronger and more confident to resume studying and reading for school next semester  Pain: 0/10  Assessment/Plan: Patient has met all POC goals at this time, but with continued RUE tingling that began approximately 2 weeks ago with some limitation still noted in cervical rotation. Patient provided with additional hand out for added exercises to address remaining deficits and is no longer medically necessary to continue PT. DC at this time    @eval: Yohana Glover is a 34year old female who presents to therapy today with complaints of chronic central neck pain last 10 yrs, down to between shoulder blades. Pt states she finally has insurance that will allow her to get therapy for this issue. 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 She has seen 2 chiropractors only for a total of 4 visits over the years, doesn't like the idea of bones cracking.  She recalls having full spine XR in 2019 without any significant findings. Occasionally she will get pain radiating to shoulders x1-2/wk. 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 Headaches 1-2x/wk usually after being at desk a long time. Some N/T R wrist that radiates to forearm if she is writing or typing a lot she will feel it. Pt is R hand dominant. Pt feels those symtpoms are not related to/different pattern from her neck pain. ~1x/mo she will 'pull a neck muscle' in middle of night. Denies any hand weakness. Works out at gym at least 4x/wk. UA to do a crunch which places too much pain on neck, UA to do plank 2o excessive wrist pain. Pt notes she incorrectly indicated concern with personal safety screening questions, and denies any conflict at home or thoughts of self-harm. Pt describes pain level  at best 1/10, at worst 5/10. Current functional limitations include neck pain c prolonged sitting/studying/looking down, headaches x1-2/wk, disturbed sleep 2o neck pain, neck pain c attempted abdominal crunch. Joaquim Garsia describes prior level of function has had some degree of pain c above activities for many years, but not as intense. Pt goals include reduce or resolve neck/upper back pain, resolve headaches, undisturbed sleep. Past medical history was reviewed with Joaquim Garsia. Significant findings include asthma. Pt denies diplopia, dysarthria, dysphasia, dizziness, drop attacks, bowel/bladder changes, saddle anesthesia, and DAKOTAH LE N/T.     Objective:      Cervical AROM (IE 11/02): (* denotes performed with pain)  Flexion: 50*  Extension: 76*  Sidebending: R 40*R medial scap; L 42  Rotation: R 73*R medial scap; L 59    Cervical AROM (12/21/2023): (* denotes performed with pain)  Flexion: 53  Extension: 72  Sidebending: R: 47 L: 53  Rotation: R: 71 L: 73     Shoulder AROM (IE 11/02/2023): (* denotes performed with pain)  Flexion: R 170* R medial scap / L 185  Abd: full and painfree B  ER HBH: full c pain on R only  IR HBB: R 21cm* thumb to C7/ L 16cm    Shoulder AROM (12/21/2023): (* denotes performed with pain)  Flexion: WNL B  Abd: WNL B  ER HBH: T4   IR HBB: R: T10 L: T8     Strength: (* denotes performed with pain)  @eval 11/02/2023: 12/21/2023   Shoulder Flex: R 4*scap/5, L 4+/5  Shoulder ABD (C5): R 4*neck/5, L 4+/5  Shld ER: R 3+/ L 3+  Shld IR: R 4/ L 4  Biceps (C6): R 5*neck/5, L 5/5  Wrist ext (C6): R 5*wrist/5, L 5/5  Triceps (C7): R 5/5, L 5/5  Wrist Flex (C7): R 5*wrist/5, L 5/5     Mid trap: R 3+*scap/5; L 3+/5  Low trap: R 3*scap/5; L 3*neck/5      Strength: R 48.8 lbs*wrist; L 45.2 lbs Shoulder Flex: 4+/5 pain free B  Shoulder ABD (C5): R: 4+/5 L: 5/5  Shld ER: 5/5 B  Shld IR: 5/5 B  Biceps (C6): 5/5 B  Wrist ext (C6): 5/5 B  Triceps (C7): 5/5 B  Wrist Flex (C7):      Mid trap: R: 4/5 L: 4/5   Low trap: R: 4/5 L: 4/5      Strength: NT       Treatment:  (\"NP\" indicates Not Performed this date)  Manual Therapy:NP    Neuromuscular Re-education:    Therapeutic Exercise:30'  Reassess  Pain Neuroscience Education  H/L with or without 1/2 FR alternating shoulder flexion/extension x20 ea R/L demonstration  H/L H/L with or without 1/2 FR shoulder abduction x20  Assisted pec Stretch  Median Nerve [place] Educaiton  H/L Thor Mobilization with Ball x20 (RUE Ext)    Future visits: R median nerve glides, functional training to maintain neutral C-spine c forward-bending activities, cont RTC/scapular strengthening     HEP:  Access Code: [email or web address] ; URL: [email or web address]  Date: 11/02/2023 ; Prepared by: Spurgeon Osgood  - Supine Head Nod Deep Neck Flexor Training  - 2 x daily - 20 reps - 5 hold  - Prone Shoulder Horizontal Abduction on Swiss Ball  - 2 x daily - 10 reps - 5 hold  - Prone Scapular Retraction  - 2 x daily - 10 reps - 5 hold  - Corner Pec Major Stretch  - 2 x daily - 2 sets - 3 reps - 15 hold  - Cat Cow  - 2 x daily - 10 reps - 5 hold  11-9: B row c band, B shld ER RTB 5\"x15, shraddha updated    Updated HEP Access Code: [email or web address]  URL: [email or web address]  Date: 12/07/2023  Prepared by: Mora at 60 Elevation  - 2-3 x daily - 7 x weekly - 5 sets - 20s hold  - Supine Shoulder Horizontal Abduction with Resistance  - 1 x daily - 7 x weekly - 3 sets - 10 reps  - Supine Alternating Shoulder Flexion  - 1 x daily - 7 x weekly - 3 sets - 10 reps  - Prone Shoulder Horizontal Abduction  - 1 x daily - 7 x weekly - 2 sets - 10 reps - 5s hold  - Prone Shoulder Horizontal Abduction (Mirrored)  - 1 x daily - 7 x weekly - 2 sets - 10 reps - 5s hold  - Prone Single Arm Shoulder Y  - 1 x daily - 7 x weekly - 2 sets - 10 reps - 5s hold  - Prone Single Arm Shoulder Y (Mirrored)  - 1 x daily - 7 x weekly - 2 sets - 10 reps - 5s hold  - Prone Scapular Retraction  - 1 x daily - 7 x weekly - 1 sets - 20 reps - 5s hold  - Supine Chin Tuck  - 1 x daily - 7 x weekly - 1 sets - 20 reps - 5 hold    -added 12/21 final HEP  Median nerve glides  Alternating shoulder flexion and extension in h/l    PLAN OF CARE:    Goals: (to be met in 8 visits)   Consistently decr pain < or = 2/10 intermittent for incr QOL and activity tolerance Met 12/21  Overall incr in function as indicated by decr NDI at least 10 pts Met 12/21  Resolution of headaches to indicate decr irritability of symptoms Met 12/21 (mild headaches only that come infreq)  Painfree AROM c-spine and shoulder to indicate decr irritability of symptoms and allow for decr pain c daily activities Met 12/21  Incr scapular strength at least 4-/5 to promote incr ability to sustain and tolerate prolonged postures Met 12/21  Lack of sleep disturbance due to neck muscle spasm for incr QOL Met 12/21  Indep HEP to promote cont progress toward functional goals Met 12/07; Updated New     Frequency / Duration: Patient will be seen for 1-2 x/week or a total of 8 visits over a 90 day period.      All Treatments performed at [facility] and separate times during the therapy session.   Treatment Minutes  Units charged   Manual Therapy 15 minutes 1   Therapeutic Activity 0 minutes    Neuromuscular Re-education 0 minutes    Therapeutic Exercise 30 minutes 2   Total Direct Treatment Time 45 minutes      Post Neck Disability Index Score  Post Score: 10 % (12/21/2023  3:51 PM)    10 % improvement

## 2024-01-22 ENCOUNTER — HOSPITAL ENCOUNTER (OUTPATIENT)
Dept: GENERAL RADIOLOGY | Age: 29
Discharge: HOME | End: 2024-01-22
Attending: INTERNAL MEDICINE
Payer: COMMERCIAL

## 2024-01-22 DIAGNOSIS — M25.561 PAIN IN BOTH KNEES: ICD-10-CM

## 2024-01-22 DIAGNOSIS — M25.562 PAIN IN BOTH KNEES: ICD-10-CM

## 2024-01-22 DIAGNOSIS — M79.605 LEG PAIN, BILATERAL: ICD-10-CM

## 2024-01-22 DIAGNOSIS — M79.604 LEG PAIN, BILATERAL: ICD-10-CM

## 2024-01-22 PROCEDURE — 73562 X-RAY EXAM OF KNEE 3: CPT | Performed by: INTERNAL MEDICINE

## 2024-02-04 ENCOUNTER — HOSPITAL ENCOUNTER (OUTPATIENT)
Dept: ULTRASOUND IMAGING | Age: 29
End: 2024-02-04
Attending: INTERNAL MEDICINE
Payer: COMMERCIAL

## 2024-02-04 ENCOUNTER — HOSPITAL ENCOUNTER (OUTPATIENT)
Dept: ULTRASOUND IMAGING | Age: 29
Discharge: HOME | End: 2024-02-04
Attending: INTERNAL MEDICINE
Payer: COMMERCIAL

## 2024-02-04 DIAGNOSIS — M79.604 LEG PAIN, BILATERAL: ICD-10-CM

## 2024-02-04 DIAGNOSIS — M25.562 PAIN IN BOTH KNEES: ICD-10-CM

## 2024-02-04 DIAGNOSIS — M79.605 LEG PAIN, BILATERAL: ICD-10-CM

## 2024-02-04 DIAGNOSIS — M25.561 PAIN IN BOTH KNEES: ICD-10-CM

## 2024-02-04 PROCEDURE — 93970 EXTREMITY STUDY: CPT | Performed by: INTERNAL MEDICINE

## 2024-02-24 ENCOUNTER — E-VISIT (OUTPATIENT)
Dept: TELEHEALTH | Age: 29
End: 2024-02-24
Payer: COMMERCIAL

## 2024-02-24 DIAGNOSIS — Z02.9 ADMINISTRATIVE ENCOUNTER: Primary | ICD-10-CM

## 2024-02-25 ENCOUNTER — HOSPITAL ENCOUNTER (OUTPATIENT)
Age: 29
Discharge: HOME | End: 2024-02-25
Payer: COMMERCIAL

## 2024-02-25 VITALS
BODY MASS INDEX: 28.52 KG/M2 | OXYGEN SATURATION: 98 % | RESPIRATION RATE: 16 BRPM | TEMPERATURE: 99 F | HEIGHT: 62 IN | DIASTOLIC BLOOD PRESSURE: 87 MMHG | SYSTOLIC BLOOD PRESSURE: 148 MMHG | HEART RATE: 74 BPM | WEIGHT: 155 LBS

## 2024-02-25 DIAGNOSIS — N94.9 GENITAL LESION, FEMALE: Primary | ICD-10-CM

## 2024-02-25 PROCEDURE — 99213 OFFICE O/P EST LOW 20 MIN: CPT

## 2024-02-25 PROCEDURE — 99203 OFFICE O/P NEW LOW 30 MIN: CPT

## 2024-02-25 RX ORDER — VALACYCLOVIR HYDROCHLORIDE 500 MG/1
500 TABLET, FILM COATED ORAL 2 TIMES DAILY
Qty: 6 TABLET | Refills: 0 | Status: SHIPPED | OUTPATIENT
Start: 2024-02-25 | End: 2024-02-28

## 2024-02-25 NOTE — PROGRESS NOTES
Refer to patient Questionnaire and responses.  See MyChart messages.    Patient is currently at an [facility] receiving care.    E-visit cancelled with no charge.

## 2024-02-25 NOTE — ED PROVIDER NOTES
Patient Seen in: [facility]      History     Chief Complaint   Patient presents with    Eval-G     Stated Complaint: perinial irritation    Subjective:   HPI    29-year-old female presents to the IC for evaluation of lesions to her vaginal area that is near her rectum.  Sore and painful.  Feels like her previous HSV infection.    Objective:   No pertinent past medical history.            No pertinent past surgical history.              No pertinent social history.            Review of Systems    Positive for stated complaint: perinial irritation  Other systems are as noted in HPI.  Constitutional and vital signs reviewed.      All other systems reviewed and negative except as noted above.    Physical Exam     ED Triage Vitals   BP 02/25/24 0944 148/87   Pulse 02/25/24 0944 74   Resp 02/25/24 0944 16   Temp 02/25/24 0946 98.7 °F (37.1 °C)   Temp src 02/25/24 0946 Temporal   SpO2 02/25/24 0944 98 %   O2 Device 02/25/24 0944 None (Room air)       Current:/87   Pulse 74   Temp 98.7 °F (37.1 °C) (Temporal)   Resp 16   Ht 157.5 cm (5' 2\")   Wt 70.3 kg   SpO2 98%   BMI 28.35 kg/m²         Physical Exam  Vitals and nursing note reviewed.   Constitutional:       Appearance: She is well-developed.   HENT:      Head: Atraumatic.      Right Ear: External ear normal.      Left Ear: External ear normal.   Eyes:      Conjunctiva/sclera: Conjunctivae normal.   Cardiovascular:      Rate and Rhythm: Normal rate.   Pulmonary:      Effort: Pulmonary effort is normal.   Genitourinary:     Exam position: Supine.      Labia:         Right: Lesion present.         Left: Lesion present.    Musculoskeletal:      Cervical back: Normal range of motion and neck supple.   Skin:     General: Skin is warm and dry.      Capillary Refill: Capillary refill takes less than 2 seconds.   Neurological:      Mental Status: She is alert.   Psychiatric:         Mood and Affect: Mood normal.               ED Course   Labs  Reviewed - No data to display                   MDM        29-year-old female presents with dental lesions.  Scattered blister noted on exam.  Consistent with HSV.  Will discharge with Valtrex.  Follow-up with gynecology.                                 Medical Decision Making      Disposition and Plan     Clinical Impression:  1. Genital lesion, female         Disposition:  Discharge  2/25/2024 10:09 am    Follow-up:  [facility] - OB/GYN  [address] [place] [address] [place] [id number] [phone]              Medications Prescribed:  Discharge Medication List as of 2/25/2024 10:12 AM        START taking these medications    Details   valACYclovir 500 MG Oral Tab Take 1 tablet (500 mg total) by mouth 2 (two) times daily for 3 days., Print, Disp-6 tablet, R-0

## 2025-08-25 ENCOUNTER — TELEPHONE (OUTPATIENT)
Dept: FAMILY MEDICINE CLINIC | Facility: CLINIC | Age: 30
End: 2025-08-25